# Patient Record
Sex: MALE | Race: BLACK OR AFRICAN AMERICAN | Employment: STUDENT | URBAN - METROPOLITAN AREA
[De-identification: names, ages, dates, MRNs, and addresses within clinical notes are randomized per-mention and may not be internally consistent; named-entity substitution may affect disease eponyms.]

---

## 2019-09-07 NOTE — ED INITIAL ASSESSMENT (HPI)
Per [employer], they received multiple calls that patient was driving up [address] and getting out of his car to go up to women and saying  \"I know you\". They found him after the 4th call at a massage parlor near [address] and [address]  When they approached, he made SI

## 2019-09-08 ENCOUNTER — APPOINTMENT (OUTPATIENT)
Dept: CT IMAGING | Facility: HOSPITAL | Age: 24
End: 2019-09-08
Attending: EMERGENCY MEDICINE
Payer: COMMERCIAL

## 2019-09-08 PROCEDURE — 70450 CT HEAD/BRAIN W/O DYE: CPT | Performed by: EMERGENCY MEDICINE
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
## 2019-09-08 NOTE — ED NOTES
Spoke with nurse at [facility] regarding placement for pt. [facility] requested pt's CT, EKG, and special needs section on [facility]. Requested this information from the ER nurse.
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
## 2019-09-08 NOTE — ED NOTES
[address] [facility] crisis worker at bedside with security standby
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
## 2019-09-08 NOTE — ED PROVIDER NOTES
No acute issues. Receiving facility requesting EKG and head CT. Patient resting quietly on the cart. EKG: The EKG revealed rate, intervals, and axis as noted on the EKG report. I have reviewed and agree with these readings.   Sinus bradycardia at 58 bpm

## 2019-09-08 NOTE — ED NOTES
Patient covering his face with the blanket, remains on O2 sat monitor. This RN at bedside for patient to remove sheet from his head, patient removes sheet, looks up, but recovers his head. Multiple attempts made, but unsuccessful. Dr. Jenaro Arellano aware.
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
## 2019-09-09 NOTE — ED NOTES
Called and left a message at [facility] inquiring about possible placement for the patient. Will attempt to contact again the afternoon if no call back is received.
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
## 2019-09-09 NOTE — ED NOTES
Per charge RN at this time we don't have the room availability for him to take a shower. Will keep an eye on room availability and try possibly later tonight.

## 2019-09-09 NOTE — ED NOTES
Faxed [facility] EKG results, CT scan and [facility] special \"needs section\" per their request.

## 2019-09-09 NOTE — ED NOTES
Spoke with Carol Black at [facility] who stated that they are not able to take the patient at this time due to pending legal charges. Updated [facility] Charge. Updated cert scanned and placed on chart.

## 2019-09-09 NOTE — ED NOTES
Patient was on the call light requesting to use the bathroom. Security was called and patient was walked to the bathroom.  Patient asking how long he will be here, RN explained to patient a exact time in unknown and as soon as I have more information I will
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
## 2019-09-09 NOTE — ED NOTES
Rounded on patient with Dr. Wilner Jensen. Patient moving around on bed.   Respirations easy and unlabored

## 2019-09-09 NOTE — ED NOTES
Patient's respirations easy and unlabored. 100% on RA. NSR on monitor.   Per Dr. Jamey Stinson, okay for patient to be off of cardiac monitor

## 2019-09-09 NOTE — ED NOTES
Patient yells out and is still not able to be redirected. When in room, patient attempts to pull against restraints.   ED MD aware

## 2019-09-09 NOTE — ED NOTES
Assumed care of patient. Patient reminded to keep blankets off of his head. Turned hald of the lights down for comfort. Patient sts he ate lunch and RN informed him a dinner tray will be ordered.  Patient asked if he needed to use the bathroom declines at t

## 2019-09-09 NOTE — ED PROVIDER NOTES
Patient take out of restraints one hour into shift. Had received Haldol and Ativan prior. Awaiting placement likely at [facility].

## 2019-09-09 NOTE — ED NOTES
Per [facility], patient will have an active warrant as of 9/11/2019.   When patient is discharged from the facility that he is transferred to, they are to call the [facility] at [phone] before he is released so kathy

## 2019-09-10 NOTE — ED NOTES
Pt continuously pressing call light button. He states he is scared of being in the room alone and wants to look at and talk to people.

## 2019-09-10 NOTE — ED NOTES
Pt ambulated to the washroom. Pt provided a toothbrush, wash cloth, soap and deodorant and allowed to complete basic hygiene. Spoke with Alida with [facility] who states they are still working on placement. Pt is cooperative but does have a labile affect.  Pt [place]

## 2019-09-10 NOTE — ED NOTES
Pt is awaiting a bed at [facility] due to Esther Pedro was not able to accept pt.

## 2019-09-10 NOTE — ED NOTES
Patient out of bed, performing his prayers, and back into bed. Did not express any needs.   Will continue to monitor

## 2019-09-10 NOTE — PROGRESS NOTES
[facility]  Psychiatric Progress Note    Nina Turner YOB: 1995   Age/Gender 20year old male MRN [id number]   Location [address] Attending Carlos West MD   Hosp Day # 0 PCP No primary care provider

## 2019-09-10 NOTE — ED NOTES
Pt now pounding on window and punching call button. Pt not able to be redirected at this time, edmd made aware.

## 2019-09-10 NOTE — ED NOTES
[facility] Introduced self to patient, who is in no apparent distress and reports no needs at this time. Patient has coffee and water near his bed. Appears to be drinking them.

## 2019-09-10 NOTE — ED NOTES
Patient moving around in bed, respirations easy and unlabored, no distress.  Will continue to monitor

## 2019-09-10 NOTE — ED PROVIDER NOTES
The patient's case was endorsed to me by Dr. Barron Rose at 2 PM today. The patient is awake, conversive and cooperative. Nursing staff advises me that the patient had one glass of water and 1 cup of coffee earlier today but nothing else to eat.   He is

## 2019-09-10 NOTE — ED NOTES
Patient's food arrived, but patient refused. Patient has completed drinking his water and coffee in the room.

## 2019-09-10 NOTE — ED NOTES
Pt knocking on window of locked room. Security called to room. Pt resting on cart upon entering room. No complaints at this time, meal tray removed.

## 2019-09-11 NOTE — ED NOTES
Pt calling on call light. States that he was told he was going home 2 hours ago and that being here is making him more crazy. Security called to bedside.  rn notified

## 2019-09-11 NOTE — ED NOTES
Dr. Whitney Armenta informed bed is available at [facility] for pt. Per Dr. Whitney Armenta 1:1 not needed at this time.

## 2019-09-11 NOTE — ED NOTES
Security informed this writer patient [facility] [address] [facility]. No further instructions provided about additional steps that need to be taken.

## 2019-10-17 PROBLEM — F32.9 MAJOR DEPRESSIVE DISORDER: Status: ACTIVE | Noted: 2019-10-17

## 2019-10-18 PROBLEM — F32.89 ATYPICAL DEPRESSIVE DISORDER: Status: ACTIVE | Noted: 2019-10-18

## 2020-06-09 NOTE — ED PROVIDER NOTES
Patient moved from be 7 to be 5. Patient was wandering out of his room looking around. Patient has no specific complaints.   Undergoing [facility] crisis worker evaluation

## 2020-06-09 NOTE — ED NOTES
Pt found wandering around a-pod stating he is looking for a place to pray. Pt calm and cooperative re-directable.  Pt ambulated back to room

## 2020-06-09 NOTE — ED NOTES
Pt lying on stretcher warm blankets issued pt attempted to sleep. Pt is calm and cooperative stating he is worried about his cousin stating he tries to control him. Pt refuses dinner tray at this time.
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
## 2020-06-09 NOTE — ED NOTES
Pt to nurses stating asking when he is going to be admitted to [facility] . Emotional support offered. Cousin remains at bs.  Pt calm and cooperative re-directed to room

## 2020-06-09 NOTE — ED PROVIDER NOTES
Patient Seen in: [facility] Emergency Department      History   Patient presents with:   Insomnia    Stated Complaint: Not sleeping    HPI    Patient is a 38-year-old male, with history of anxiety, depression, and acute psychosis, presenting for eval without evidence of trauma. Extraocular muscles are intact. Pupils are equal and reactive to light. Oropharynx is pink and moist.  NECK: Neck is supple and nontender. The trachea is midline. LUNGS: Lungs are clear, respirations are unlabored.    HEART: Re DIFFERENTIAL[[id number]]          Abnormal            Final result                 Please view results for these tests on the individual orders. DRUG SCREEN 7 W/CONFIRMATION, URINE              MDM     Patient was placed on the cart and evaluated.   Blood

## 2020-06-09 NOTE — ED NOTES
Patient is resting comfortably. Cousin at . Pt is calm and cooperative pt cousin staating he spent 1 mo in [facility] last year released home and went back shortly after discharge for another 3wks. Cousin is not sure why.
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
## 2020-06-10 NOTE — ED NOTES
Spoke to [facility] at [facility]. Did not share whether or not beds would be available today, but requested additional info be faxed.  Requesting updated page 3 of [employer], info on pt's legal status, urinalysis, ekg, current vitals, and med list. Will work on getting t

## 2020-06-10 NOTE — ED NOTES
Still working on obtaining add'l information to fax to [facility]. Have received urinalysis, med list, vitals, and psych eval. Awaiting info on legal status and update page on [facility].

## 2020-06-10 NOTE — CONSULTS
[facility]  Psychiatric Evaluation    Jael Burden YOB: 1995   Age/Gender 30year old male MRN [id number]   Location [address] Attending Delmi Stock MD   Hosp Day # 0 PCP No primary care provider on nausea, vomiting, diarrhea, constipation, trouble urinating, pain or burning on urinating, joint pain or swelling, muscle aches or pains, rashes, unexplained bruising, paresthesias, or malaise. Past Medical History:  None.     Past Surgical History:  N skills. Insight and judgment probably are impaired. Strengths:  The patient was able to identify his dad as a support. Guadalupe Riedel

## 2020-06-10 NOTE — ED NOTES
Patient completed shower and is back in room. Denies wanting visitors at this time, including cousin who was here yesterday.  Will continue to monitor patient while awaiting placement

## 2020-06-10 NOTE — ED NOTES
Patient's cousin on phone for patient ~ patient reports he does not know how to explain it but he does not want to speak with anyone right now. Patient resting comfortably on cot at this time.  Provided water per request w/ no other needs voiced at this

## 2020-06-10 NOTE — ED NOTES
Patient requesting to shower ~ Male ED tech currently 1:1 for patient shower along with security at door. Patient provided fresh gown, underwear and socks. Bed sheets/pillow/blankets changed out for fresh set.  Will continue to monitor when patient returns
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
## 2020-06-10 NOTE — ED NOTES
Faxed to [facility], updated vitals, urinalysis, and psych eval. Jhoan Hernandez from [facility] states they will have no more available beds today, but with all the information faxed, he will be on the \"list\" to get a bed tomorrow.  CHEMO will still have to call in th

## 2020-06-10 NOTE — ED PROVIDER NOTES
EKG    Rate, intervals and axes as noted on EKG Report. Rate: 70  Rhythm: Sinus Rhythm  Reading: No evidence of acute ischemia    Patient calm and cooperative throughout shift. Continues to await placement. Will monitor.

## 2020-06-10 NOTE — ED NOTES
06/10/20 0555   Vitals   Pulse 74   Resp 16   /70   Oxygen Therapy   SpO2 99 %          06/10/20 0818   Vitals   Pulse 77   Resp 16   /84

## 2020-06-11 NOTE — BH PROGRESS NOTE
Reached out to [facility] @ 08:23 - spoke with Diogenes Gomez who states that there are no beds available today, \"may be some discharges tomorrow, but he is on our list\".

## 2020-06-11 NOTE — ED NOTES
[facility] requesting retracing of EKG. ARI Tan aware. Will fax once complete. [facility] still has NO discharges today, but will have some tomorrow.
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
## 2020-06-11 NOTE — ED NOTES
Ambulated to and from BR without difficulty. Advised cousin called for him while he was sleeping and that breakfast tray is on the way.

## 2020-06-11 NOTE — ED NOTES
Spoke with Srinivas Smith at [facility] reviewed, and as long as current pt at [facility] as planned, pt will be accepted for transfer.  Srinivas Smith requesting that updated p&c be faxed

## 2020-06-11 NOTE — ED NOTES
Patient on call light asking to take a shower. Writer informed him that at this moment we have other patients in the room with the shower but that we may be able to set up a time later tonight when the room frees up.

## 2020-06-12 NOTE — ED NOTES
Patient is resting comfortably.  Will continue to monitor until placement at [facility] per rpt from [facility] and [place], [place]

## 2020-06-12 NOTE — ED PROVIDER NOTES
Patient is a 49-year-old male currently awaiting placement in inpatient psychiatric facility. He has no additional complaints.

## 2020-06-12 NOTE — ED NOTES
Rpt to [facility], RN. Unit name [facility]. Will be seen by Dr. Tri Martines.

## 2020-06-12 NOTE — ED NOTES
Spoke with Mar Martini, faxed updated P&C per their request. Site to follow up regarding transfer status asap.

## 2020-06-12 NOTE — ED NOTES
Spoke with Idaila Clemons at [facility] - pt accepted and he will call RN Yvan Gomes to complete nurse to nurse. Accepting doc is Dr. Clarice Coles and pt will be going to [facility] unit.

## 2020-06-12 NOTE — ED NOTES
This rn to bedside with security standby. Pt resting, head on pillow. Calm cooperative. Requesting shower, denying breakfast at this time. Pt notified we will arrange a shower as soon as possible.

## 2020-06-12 NOTE — ED NOTES
Transport arranged. Pt showered and given breakfast prior to transport. ETA of [facility] is 60 minutes. Pt refused morning medication.  Noted in the [facility]

## 2020-06-12 NOTE — ED NOTES
This RN to bedside. Pt is wrapped in sheet. Refusing to eat breakfast. Remains calm throughout conversation. Pt aware he is being transferred to [facility].

## 2020-06-12 NOTE — ED NOTES
Family updated as to patient status. Aware he will be transferred to [address] [place] [address].  Spoke with Merritt Peña [phone]

## 2020-06-12 NOTE — ED NOTES
The patient is acutely psychotic. Patient is awaiting placement at [facility]. Was sleeping comfortably when I saw him. The patient's drug screen was negative, urinalysis was negative. Rapid Kopit was negative.   Otherwise all other blood work, lactic acid,

## 2020-06-15 NOTE — ED PROVIDER NOTES
EKG  Rate, Axis and intervals as noted. I agree with computer interpretation.   Rate: 80  Rhythm: Normal sinus rhythm  Reading: No acute changes

## 2020-12-06 NOTE — ED PROVIDER NOTES
Patient Seen in: [facility] Emergency Department      History   Patient presents with:  Eval-P    Stated Complaint: eval p    HPI    24-year-old -American male brought to the emergency room today for complaint of need psych eval.  Patient joey palpation there is no rebound or guarding noted no hepatosplenomegaly is noted. No masses are noted. No hernias are palpated. Extremity exam reveals no clubbing cyanosis or edema.   Patient has good radial pulses noted bilaterally in the upper extremit again he may need to be transferred for admission for his psychosis. At this point case return to my associate make final disposition pending Jonny Vieira evaluation.                         Disposition and Plan     Clinical Impression:  Psychosis, unspecif

## 2020-12-06 NOTE — ED NOTES
Pt unable to provide urine at this time, pt talking to self, pt wants to go home , pt said \"I have someone to pick me up. \", unable to provide information, petition on chart

## 2020-12-06 NOTE — ED NOTES
Received patient from [facility]. Patient in room, shouting, verbally aggressive with staff. Patient medicated but still awake at this time.

## 2020-12-06 NOTE — ED NOTES
Elta Leventhal RN notified that we are still waiting on urine.   Pt has external catheter attached and gatorade at bedside

## 2020-12-06 NOTE — ED NOTES
Pt refused to sign the Rights of Individuals form and the Application for Voluntary Admission. The P&C and Rights were faxed to the [place] [phone] and scanned into pt's chart.

## 2020-12-06 NOTE — BH LEVEL OF CARE ASSESSMENT
Level of Care Assessment Note    General Questions  Why are you here?: (Pt continuously praying between statements.) Can I use the shower? I never been here. Friends bring me here today because I first I wasn't myself for a long time.   I don't know since non-compliance with medication         Suicide Risk  Source of information for CSSR: Patient  In what setting is the screener performed?: in person  1. Have you wished you were dead or wished you could go to sleep and not wake up? (past 30 days): No  2.  [place] Self-Injurious Behaviors Within the Last 30 Days: No    Mental Health Symptoms  Hallucination Type: Auditory; Visual  Describe Hallucinations: Pt reports \"seeing signs,\" described seeing three heads on his head, visions of Hell.   Pt appeared to experience 2019  Date Last Seen: October 2019  Reason: psychosis  Effectiveness: Unable to assess, pt did not answer     Prior Other Inpatient  Name: [facility]  Dates of Treatment: June 2020  Date Last Seen: June 2020  Reason: psychosis           Cur to[de-identified] Not appropriate for reporting to authorities    Patient's legal sex: male  Patient identifies as: male  Patient's birth sex: male    General Appearance  Characteristics: Appropriate clothing  Eye Contact: Indirect  Psychomotor Behavior  Gait/Movement: someone else. Pt denied SI or HI, reported “I don’t want to hear that question.”  Pt was oriented 4x. Pt denies substance use. Risk/Protective Factors  Risk Factors: Current/past psychiatric disorder; No current treatment;Pattern of impulsive decision

## 2020-12-06 NOTE — ED NOTES
Patient hopped out of bed and attempted to go to the bathroom. Patient redirected to go back to his room. Security called. Security redirected patient into room. Patient not able to be redirected and tried to leave. 4 point restraints applied.

## 2020-12-06 NOTE — ED NOTES
Patient signed out pending psychiatric evaluation. Patient with history of psychosis. Has been noncompliant with medications. He apparently has had aggressive behavior in the past.  Patient tried to abscond and then lunged at staff.   He did not respond

## 2020-12-06 NOTE — ED NOTES
Pt accepted at [facility] under Dr. Jose D Billy.  Arrange transport to  pt in ED at 19:00

## 2020-12-06 NOTE — ED INITIAL ASSESSMENT (HPI)
Pt here from [facility] for medical clearance. Pt is manic and is having Catholic and sexual preoccupation.

## 2020-12-06 NOTE — ED NOTES
Pt aware we need urine, external catheter applied. Pt states, \"okay. \" but failed to provide urine at this time.

## 2021-09-07 NOTE — ED NOTES
Patient ambulated to bathroom with rn and  Same Histology In Subsequent Stages Text: The pattern and morphology of the tumor is as described in the first stage.